# Patient Record
Sex: MALE | Race: WHITE
[De-identification: names, ages, dates, MRNs, and addresses within clinical notes are randomized per-mention and may not be internally consistent; named-entity substitution may affect disease eponyms.]

---

## 2021-08-26 PROBLEM — N20.1 LEFT URETERAL CALCULUS: Status: ACTIVE | Noted: 2021-08-26

## 2021-08-26 PROBLEM — N20.0 BILATERAL KIDNEY STONES: Status: ACTIVE | Noted: 2021-08-26

## 2022-05-08 ENCOUNTER — NURSE TRIAGE (OUTPATIENT)
Dept: OTHER | Facility: CLINIC | Age: 47
End: 2022-05-08

## 2022-05-08 NOTE — TELEPHONE ENCOUNTER
Caller requesting RN send him an e-mail with a list Psychiatrist in his area. Patient denies any needs for triage and states \"he wants to work on some things and improve himself. \" This RN emailed patient a list as requested. Patient denies any further questions or concerns.      Reason for Disposition   General information question, no triage required and triager able to answer question    Protocols used: INFORMATION ONLY CALL - NO TRIAGE-ADULT-